# Patient Record
Sex: MALE | Race: OTHER
[De-identification: names, ages, dates, MRNs, and addresses within clinical notes are randomized per-mention and may not be internally consistent; named-entity substitution may affect disease eponyms.]

---

## 2018-04-09 NOTE — PDOC
History of Present Illness





- General


History Source: Patient


Exam Limitations: No Limitations





- History of Present Illness


Initial Comments: 





04/09/18 01:42











The patient is a 17 year old male with a past medical history of hypothyroidism 

(noncompliant with medication) and prediabetes who presents to the ED with one 

month of headache. Patient reports one month of persistent headaches. He states 

his headache is in the back of his head and 3 days ago his headache developed 

to the back of his neck. Patient comes into the ED today after his headache 

worsened and he had a sudden onset of lightheadedness, slight chills, blurry 

vision and generalized weakness that lasted for several seconds before residing 

while walking home from work. Denies loss of consciousness. He states his 

headache was a 8/10 today and was the worst it has ever been. Patient took 

advil around 9:30 pm tonight with slight relief.


Patient states he stopped taking his synthroid 100 mcg 2 years ago. 


Denies fever or chills. Denies chest pain or shortness of breath. Denies nausea

, vomiting, or diarrhea. Denies dysuria or change in urinary output. Denies any 

other symptoms.   














<Fatou Jain - Last Filed: 04/09/18 02:47>





<Aixa Cartagena - Last Filed: 04/09/18 03:03>





- General


Chief Complaint: Headache


Stated Complaint: HEADACHE,DIZZINESS


Time Seen by Provider: 04/09/18 00:51





Past History





<Fatou Jain - Last Filed: 04/09/18 02:47>





- Past Medical History


COPD: No





- Suicide/Smoking/Psychosocial Hx


Smoking History: Never smoked


Hx Alcohol Use: No





<Aixa Cartagena - Last Filed: 04/09/18 03:03>





- Past Medical History


Allergies/Adverse Reactions: 


 Allergies











Allergy/AdvReac Type Severity Reaction Status Date / Time


 


No Known Allergies Allergy   Verified 04/09/18 00:01











Home Medications: 


Ambulatory Orders





Ondansetron [Zofran Odt -] 4 mg SL TID #9 od.tablet 01/23/15 











**Review of Systems





- Review of Systems


Able to Perform ROS?: Yes


Comments:: 





04/09/18 01:42


CONSTITUTIONAL: + generalized weakness, chills 


Absent: fever, diaphoresis, malaise, loss of appetite


HEENT: + blurry vision 


Absent: rhinorrhea, nasal congestion, throat pain, throat swelling, difficulty 

swallowing, mouth swelling, ear pain, eye pain


CARDIOVASCULAR: 


Absent: chest pain, syncope, palpitations, irregular heart rate, lightheadedness

, peripheral edema


RESPIRATORY: 


Absent: cough, shortness of breath, dyspnea with exertion, orthopnea, wheezing, 

stridor, hemoptysis


GASTROINTESTINAL:


Absent: abdominal pain, abdominal distension, nausea, vomiting, diarrhea, 

constipation, melena, hematochezia


GENITOURINARY: 


Absent: dysuria, frequency, urgency, hesitancy, hematuria, flank pain, genital 

pain


MUSCULOSKELETAL: 


Absent: myalgia, arthralgia, joint swelling


SKIN: 


Absent: rash, itching, pallor


HEMATOLOGIC/IMMUNOLOGIC: 


Absent: easy bleeding, easy bruising, lymphadenopathy, frequent infections


ENDOCRINE:


Absent: unexplained weight gain, unexplained weight loss, heat intolerance, 

cold intolerance


NEUROLOGIC: + headache, lightheadedness, neck pain


Absent: focal weakness or paresthesias, unsteady gait, seizure, mental status 

changes, bladder or bowel incontinence


PSYCHIATRIC: 


Absent: anxiety, depression, suicidal or homicidal ideation, hallucinations.





All Other Systems: Reviewed and Negative





<Fatou Jain - Last Filed: 04/09/18 02:47>





*Physical Exam





- Vital Signs


 Last Vital Signs











Temp Pulse Resp BP Pulse Ox


 


 98.1 F   86   18   135/69   100 


 


 04/09/18 00:02  04/09/18 00:02  04/09/18 00:02  04/09/18 00:02  04/09/18 00:02














- Physical Exam


Comments: 





04/09/18 01:43





GENERAL: + overweight 


Awake and alert. No acute distress.


HEENT:


Normocephalic, atraumatic. PERRLA, EOMI. No conjunctival pallor. Sclera are non-

icteric. Moist mucous membranes. Oropharynx is clear.


NECK: 


Supple. Full ROM. No JVD. Carotid pulses 2+ and symmetric, without bruits. No 

thyromegaly. No lymphadenopathy.


CARDIOVASCULAR:


Regular rate and rhythm. No murmurs, rubs, or gallops. Distal pulses are 2+ and 

symmetric. 


PULMONARY: 


No evidence of respiratory distress. Lungs clear to auscultation bilaterally. 

No wheezing, rales or rhonchi.


ABDOMINAL:


Soft. Non-tender. Non-distended. No rebound or guarding. No organomegaly. 

Normoactive bowel sounds. 


MUSCULOSKELETAL 


Normal range of motion at all joints. No bony deformities or tenderness. No CVA 

tenderness.


EXTREMITIES: 


No cyanosis. No clubbing. No edema. No calf tenderness.


SKIN: 


Warm and dry. Normal capillary refill. No rashes. No jaundice. 


NEUROLOGICAL: 


Alert, awake, appropriate. Cranial nerves 2-12 intact. No deficits to light 

touch and temperature in face, upper extremities and lower extremities. No 

motor deficits in the in face, upper extremities and lower extremities. 

Normoreflexic in the upper and lower extremities. Normal speech. Toes are down-

going bilaterally. Gait is normal without ataxia.


PSYCHIATRIC: 


Cooperative. Good eye contact. Appropriate mood and affect.





<Fatou Jain - Last Filed: 04/09/18 02:47>





- Vital Signs


 Last Vital Signs











Temp Pulse Resp BP Pulse Ox


 


 98.1 F   86   18   135/69   100 


 


 04/09/18 00:02  04/09/18 00:02  04/09/18 00:02  04/09/18 00:02  04/09/18 00:02














<Aixa Cartagena - Last Filed: 04/09/18 03:03>





ED Treatment Course





- LABORATORY


CBC & Chemistry Diagram: 


 04/09/18 01:30





 04/09/18 01:30





- RADIOLOGY


Radiograph Interpretation: 





04/09/18 02:47


EXAM: HEAD CT WITHOUT CONTRAST


IMPRESSION: Normal exam.


Reported by: Imaging on call 





<Fatou Jain - Last Filed: 04/09/18 02:47>





- LABORATORY


CBC & Chemistry Diagram: 


 04/09/18 01:30





 04/09/18 01:30





<Aixa Cartagena - Last Filed: 04/09/18 03:03>





Medical Decision Making





- Medical Decision Making





04/09/18 02:05


Pt comes withheadache x 1 month; felt it strong today while he was walking home 

from work. States he left work early today ( at [employer])





04/09/18 02:45


Patient Name: JEROME DIXON


THIS IS A PRELIMINARY REPORT FROM IMAGING ON CALL


DATE OF SERVICE: 2018-04-09 01:41:38


IMAGES: [id number]


EXAM: SINUS CT W/O CONTRAST


HISTORY: Headaches for one week


COMPARISON: None.


FINDINGS: The intraorbital contents are intact. The sinuses and visualized 

mastoid air cells are


well aerated. There is no fracture.


IMPRESSION: Normal exam





Head CT and sinus CT normal; labs normal; labs normal.





<Aixa Cartagena - Last Filed: 04/09/18 03:03>





*DC/Admit/Observation/Transfer





- Attestations


Scribe Attestion: 





04/09/18 01:47





Documentation prepared by Fatou Jain, acting as medical scribe for Aixa Cartagena MD





<Fatou Jain - Last Filed: 04/09/18 02:47>





- Discharge Dispostion


Admit: No





<Aixa Cartagena - Last Filed: 04/09/18 03:03>


Diagnosis at time of Disposition: 


 Headache








- Discharge Dispostion


Disposition: HOME


Condition at time of disposition: Stable





- Referrals


Referrals: 


Marilynn Holbrook MD [Primary Care Provider] - 





- Patient Instructions





- Post Discharge Activity


Forms/Work/School Notes:  Back to Work, Back to School

## 2021-01-11 ENCOUNTER — HOSPITAL ENCOUNTER (EMERGENCY)
Dept: HOSPITAL 74 - JVIRT | Age: 21
Discharge: HOME | End: 2021-01-11
Payer: COMMERCIAL

## 2021-01-11 DIAGNOSIS — U07.1: Primary | ICD-10-CM

## 2021-01-11 PROCEDURE — U0003 INFECTIOUS AGENT DETECTION BY NUCLEIC ACID (DNA OR RNA); SEVERE ACUTE RESPIRATORY SYNDROME CORONAVIRUS 2 (SARS-COV-2) (CORONAVIRUS DISEASE [COVID-19]), AMPLIFIED PROBE TECHNIQUE, MAKING USE OF HIGH THROUGHPUT TECHNOLOGIES AS DESCRIBED BY CMS-2020-01-R: HCPCS

## 2021-01-11 PROCEDURE — C9803 HOPD COVID-19 SPEC COLLECT: HCPCS

## 2021-02-08 ENCOUNTER — HOSPITAL ENCOUNTER (EMERGENCY)
Dept: HOSPITAL 74 - JVIRT | Age: 21
Discharge: HOME | End: 2021-02-08
Payer: COMMERCIAL

## 2021-02-08 DIAGNOSIS — Z11.52: Primary | ICD-10-CM

## 2021-02-08 PROCEDURE — U0003 INFECTIOUS AGENT DETECTION BY NUCLEIC ACID (DNA OR RNA); SEVERE ACUTE RESPIRATORY SYNDROME CORONAVIRUS 2 (SARS-COV-2) (CORONAVIRUS DISEASE [COVID-19]), AMPLIFIED PROBE TECHNIQUE, MAKING USE OF HIGH THROUGHPUT TECHNOLOGIES AS DESCRIBED BY CMS-2020-01-R: HCPCS

## 2021-02-08 PROCEDURE — C9803 HOPD COVID-19 SPEC COLLECT: HCPCS

## 2022-11-24 ENCOUNTER — HOSPITAL ENCOUNTER (EMERGENCY)
Dept: HOSPITAL 74 - JER | Age: 22
LOS: 1 days | Discharge: HOME | End: 2022-11-25
Payer: COMMERCIAL

## 2022-11-24 VITALS — BODY MASS INDEX: 34.8 KG/M2

## 2022-11-24 VITALS — RESPIRATION RATE: 18 BRPM

## 2022-11-25 VITALS — TEMPERATURE: 98.8 F | DIASTOLIC BLOOD PRESSURE: 77 MMHG | HEART RATE: 110 BPM | SYSTOLIC BLOOD PRESSURE: 132 MMHG

## 2023-06-10 ENCOUNTER — HOSPITAL ENCOUNTER (EMERGENCY)
Dept: HOSPITAL 74 - JER | Age: 23
Discharge: HOME | End: 2023-06-10
Payer: COMMERCIAL

## 2023-06-10 VITALS
TEMPERATURE: 97 F | RESPIRATION RATE: 18 BRPM | HEART RATE: 91 BPM | SYSTOLIC BLOOD PRESSURE: 126 MMHG | DIASTOLIC BLOOD PRESSURE: 81 MMHG

## 2023-06-10 VITALS — BODY MASS INDEX: 37.6 KG/M2

## 2023-06-10 DIAGNOSIS — R07.89: Primary | ICD-10-CM

## 2023-06-10 LAB
ALBUMIN SERPL-MCNC: 3.7 G/DL (ref 3.4–5)
ALP SERPL-CCNC: 87 U/L (ref 45–117)
ALT SERPL-CCNC: 32 U/L (ref 13–61)
ANION GAP SERPL CALC-SCNC: 5 MMOL/L (ref 8–16)
AST SERPL-CCNC: 22 U/L (ref 15–37)
BASOPHILS # BLD: 0.5 % (ref 0–2)
BILIRUB CONJ SERPL-MCNC: 0.1 MG/DL (ref 0–0.2)
BILIRUB SERPL-MCNC: 0.3 MG/DL (ref 0.2–1)
BUN SERPL-MCNC: 15.4 MG/DL (ref 7–18)
CALCIUM SERPL-MCNC: 9.6 MG/DL (ref 8.5–10.1)
CHLORIDE SERPL-SCNC: 105 MMOL/L (ref 98–107)
CO2 SERPL-SCNC: 31 MMOL/L (ref 21–32)
CREAT SERPL-MCNC: 1 MG/DL (ref 0.55–1.3)
DEPRECATED RDW RBC AUTO: 13.6 % (ref 11.9–15.9)
EOSINOPHIL # BLD: 2.6 % (ref 0–4.5)
GLUCOSE SERPL-MCNC: 100 MG/DL (ref 74–106)
HCT VFR BLD CALC: 46.2 % (ref 35.4–49)
HGB BLD-MCNC: 15.6 GM/DL (ref 11.7–16.9)
LYMPHOCYTES # BLD: 25.1 % (ref 8–40)
MCH RBC QN AUTO: 29.2 PG (ref 25.7–33.7)
MCHC RBC AUTO-ENTMCNC: 33.7 G/DL (ref 32–35.9)
MCV RBC: 86.8 FL (ref 80–96)
MONOCYTES # BLD AUTO: 8.5 % (ref 3.8–10.2)
NEUTROPHILS # BLD: 63.3 % (ref 42.8–82.8)
PLATELET # BLD AUTO: 238 10^3/UL (ref 134–434)
PMV BLD: 8.6 FL (ref 7.5–11.1)
POTASSIUM SERPLBLD-SCNC: 4.8 MMOL/L (ref 3.5–5.1)
PROT SERPL-MCNC: 7.5 G/DL (ref 6.4–8.2)
RBC # BLD AUTO: 5.32 M/MM3 (ref 4–5.6)
SODIUM SERPL-SCNC: 142 MMOL/L (ref 136–145)
WBC # BLD AUTO: 6.7 K/MM3 (ref 4–10)

## 2023-06-10 PROCEDURE — 3E0233Z INTRODUCTION OF ANTI-INFLAMMATORY INTO MUSCLE, PERCUTANEOUS APPROACH: ICD-10-PCS
